# Patient Record
Sex: MALE | Race: WHITE | NOT HISPANIC OR LATINO | Employment: FULL TIME | URBAN - METROPOLITAN AREA
[De-identification: names, ages, dates, MRNs, and addresses within clinical notes are randomized per-mention and may not be internally consistent; named-entity substitution may affect disease eponyms.]

---

## 2019-02-15 ENCOUNTER — OFFICE VISIT (OUTPATIENT)
Dept: FAMILY MEDICINE CLINIC | Facility: CLINIC | Age: 34
End: 2019-02-15
Payer: COMMERCIAL

## 2019-02-15 VITALS
SYSTOLIC BLOOD PRESSURE: 132 MMHG | OXYGEN SATURATION: 96 % | DIASTOLIC BLOOD PRESSURE: 82 MMHG | BODY MASS INDEX: 26.27 KG/M2 | TEMPERATURE: 98.4 F | WEIGHT: 222.5 LBS | HEIGHT: 77 IN | HEART RATE: 95 BPM

## 2019-02-15 DIAGNOSIS — Z76.89 ENCOUNTER TO ESTABLISH CARE: ICD-10-CM

## 2019-02-15 DIAGNOSIS — M25.511 CHRONIC RIGHT SHOULDER PAIN: ICD-10-CM

## 2019-02-15 DIAGNOSIS — M25.522 LEFT ELBOW PAIN: ICD-10-CM

## 2019-02-15 DIAGNOSIS — H00.014 HORDEOLUM EXTERNUM LEFT UPPER EYELID: Primary | ICD-10-CM

## 2019-02-15 DIAGNOSIS — G89.29 CHRONIC RIGHT SHOULDER PAIN: ICD-10-CM

## 2019-02-15 PROCEDURE — 3008F BODY MASS INDEX DOCD: CPT | Performed by: NURSE PRACTITIONER

## 2019-02-15 PROCEDURE — 99202 OFFICE O/P NEW SF 15 MIN: CPT | Performed by: NURSE PRACTITIONER

## 2019-02-15 RX ORDER — BACITRACIN 500 [USP'U]/G
OINTMENT OPHTHALMIC 3 TIMES DAILY
Qty: 3.5 G | Refills: 0 | Status: SHIPPED | OUTPATIENT
Start: 2019-02-15 | End: 2019-02-16 | Stop reason: SDUPTHER

## 2019-02-15 NOTE — PROGRESS NOTES
[facility]    ASSESSMENT AND PLAN     1  Hordeolum externum left upper eyelid  65-year-old male presents today with signs and symptoms consistent of a stye his left upper eyelid  Physical assessment is as documented below  Rx for bacitracin ophthalmic ointment given today  Symptom management reviewed:  Maintain good hygiene,  warm compresses to eye  Advised he be re-evaluated should his symptoms change, persist and/or worsen  - bacitracin ophthalmic ointment; Administer into the left eye 3 (three) times a day  Dispense: 3 5 g; Refill: 0    2  Encounter to establish care  Patient establishing primary care in this office  3  Chronic right shoulder pain  Patient complains of occasional chronic right shoulder pain from an old right shoulder injury  Celso Aashish He is not currently having any shoulder pain today  He just wanted to have it in his record  He has had physical therapy in the past with good results  4  Left elbow pain  Patient complains of left elbow pain  He states he thinks he may have pulled it while shoveling snow last week  He states the pain has been resolving over the last few days  He has full range of motion of the elbow  Good pulses  Advised to ice and/or heat for symptom management and ibuprofen as needed  He is to be re-evaluated should his symptoms change, persist and/or worsen  NOTE:  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  He takes Tylenol and/or Advil only for pain  SUBJECTIVE       Patient ID: Edgar Copeland is a 28 y o  male  Chief Complaint   Patient presents with   [facility]    Shoulder Pain     R shoulder off and on x years    Elbow Pain     L elbow pain with extension since shoveling x 4 days    Stye     L upper eyelid x 1 week       HISTORY OF PRESENT ILLNESS    Patient presents today with complaint of a pimple in his left upper eyelid x1 week  Seems to be getting larger and not getting better    Nothing is draining out of it   Vision is intact  Denies pain  He is also here to establish care in this office  He wanted to discuss shoulder pain that he has had on and off for several years, secondary to an old shoulder injury  He is not currently having pain today  Currently, he is having some left elbow pain that started after shoveling snow a several days ago  The following portions of the patient's history were reviewed and updated as appropriate: allergies, current medications, past medical history and problem list     REVIEW OF SYSTEMS  Review of Systems   Constitutional: Negative  HENT: Negative  Eyes: Negative for pain, discharge and redness  Pimple left upper eyelid   Respiratory: Negative  Cardiovascular: Negative  Neurological: Negative  Psychiatric/Behavioral: Negative  OBJECTIVE      VITAL SIGNS  /82 (BP Location: Left arm, Patient Position: Sitting)   Pulse 95   Temp 98 4 °F (36 9 °C) (Tympanic)   Ht 6' 5" (1 956 m)   Wt 101 kg (222 lb 8 oz)   SpO2 96%   BMI 26 38 kg/m²     CURRENT MEDICATIONS    Current Outpatient Medications:     bacitracin ophthalmic ointment, Administer into the left eye 3 (three) times a day, Disp: 3 5 g, Rfl: 0      PHYSICAL EXAMINATION   Physical Exam   Constitutional: Vital signs are normal  He appears well-developed and well-nourished  HENT:   Head: Normocephalic  Eyes: Pupils are equal, round, and reactive to light  Conjunctivae and EOM are normal  Left eye exhibits hordeolum  Cardiovascular: Normal rate, regular rhythm and normal heart sounds  Pulses:       Radial pulses are 2+ on the left side  Pulmonary/Chest: Effort normal and breath sounds normal  He has no decreased breath sounds  He has no wheezes  Musculoskeletal:        Right shoulder: He exhibits normal range of motion, no tenderness, no pain and normal strength  Left elbow: He exhibits normal range of motion, no swelling and no effusion  No tenderness found  Lymphadenopathy:        Head (right side): No submental, no submandibular, no tonsillar, no preauricular, no posterior auricular and no occipital adenopathy present  Head (left side): No submental, no submandibular, no tonsillar, no preauricular, no posterior auricular and no occipital adenopathy present  He has no cervical adenopathy  Psychiatric: He has a normal mood and affect  His speech is normal and behavior is normal  Judgment and thought content normal  Cognition and memory are normal    Nursing note and vitals reviewed

## 2019-02-16 ENCOUNTER — TELEPHONE (OUTPATIENT)
Dept: FAMILY MEDICINE CLINIC | Facility: CLINIC | Age: 34
End: 2019-02-16

## 2019-02-16 DIAGNOSIS — H00.014 HORDEOLUM EXTERNUM LEFT UPPER EYELID: ICD-10-CM

## 2019-02-16 RX ORDER — BACITRACIN 500 [USP'U]/G
OINTMENT OPHTHALMIC 3 TIMES DAILY
Qty: 3.5 G | Refills: 0 | Status: SHIPPED | OUTPATIENT
Start: 2019-02-16

## 2019-02-16 NOTE — TELEPHONE ENCOUNTER
Patient called the office stating he was seen yesterday 2/15/2019  Patient states that he was prescribed an ointment for his eye  Patient states medication was to go to [facility] but was not there  I did double check and it looks like medication went to [facility]   Please send rx to [facility]

## 2019-11-25 ENCOUNTER — OFFICE VISIT (OUTPATIENT)
Dept: FAMILY MEDICINE CLINIC | Facility: CLINIC | Age: 34
End: 2019-11-25
Payer: COMMERCIAL

## 2019-11-25 VITALS
SYSTOLIC BLOOD PRESSURE: 110 MMHG | BODY MASS INDEX: 27.47 KG/M2 | HEIGHT: 77 IN | DIASTOLIC BLOOD PRESSURE: 82 MMHG | TEMPERATURE: 97.9 F | OXYGEN SATURATION: 96 % | HEART RATE: 60 BPM | RESPIRATION RATE: 17 BRPM | WEIGHT: 232.6 LBS

## 2019-11-25 DIAGNOSIS — Z23 NEED FOR PROPHYLACTIC VACCINATION AND INOCULATION AGAINST INFLUENZA: ICD-10-CM

## 2019-11-25 DIAGNOSIS — S01.111A EYEBROW LACERATION, RIGHT, INITIAL ENCOUNTER: Primary | ICD-10-CM

## 2019-11-25 PROCEDURE — 90686 IIV4 VACC NO PRSV 0.5 ML IM: CPT | Performed by: FAMILY MEDICINE

## 2019-11-25 PROCEDURE — 99213 OFFICE O/P EST LOW 20 MIN: CPT | Performed by: FAMILY MEDICINE

## 2019-11-25 PROCEDURE — 90471 IMMUNIZATION ADMIN: CPT | Performed by: FAMILY MEDICINE

## 2019-11-25 NOTE — PROGRESS NOTES
Assessment/Plan:   1  Eyebrow laceration, right, initial encounter  Patient tolerated suture removal very well today  His wound appears clean dry and intact  He was instructed on the importance of proper wound care  Per patient, he is up-to-date with his Adacel vaccine  Will attempt to request this record  Follow up with patient if any symptoms worsen  2  Need for prophylactic vaccination and inoculation against influenza  - influenza vaccine, [id number], quadrivalent, 0 5 mL, preservative-free, for adult and pediatric patients 6 mos+ (AFLURIA, [address] [facility])           Diagnoses and all orders for this visit:    Need for prophylactic vaccination and inoculation against influenza  -     influenza vaccine, [id number], quadrivalent, 0 5 mL, preservative-free, for adult and pediatric patients 6 mos+ (AFLURIA, FLUARIX, FLULAVAL, FLUZONE)          Subjective:       Chief Complaint   Patient presents with    Suture / Staple Removal     #7 R eye 11/20      Patient ID: Gabi Leiva is a 27 y o  male  Patient is a 44-year-old male presents today for a hospital follow-up  He states that he was seen in the ED secondary to a laceration he sustained over his right eyebrow  He states that he was working on a pipe by loosening the pipe however upon removing the PIP struck his head  He did notice a small laceration as well as bleeding  He was seen in the ED and did have an evaluation  He did have a CT of his head which did appear normal   He did have 7 sutures placed in like to rule him today  He states that he is up-to-date with his Adacel vaccine      Review of Systems   Constitutional: Negative for activity change, chills, fatigue and fever  HENT: Negative for congestion, ear pain, sinus pressure and sore throat  Eyes: Negative for redness, itching and visual disturbance  Respiratory: Negative for cough and shortness of breath  Cardiovascular: Negative for chest pain and palpitations  Gastrointestinal: Negative for abdominal pain, diarrhea and nausea  Endocrine: Negative for cold intolerance and heat intolerance  Genitourinary: Negative for dysuria, flank pain and frequency  Musculoskeletal: Negative for arthralgias, back pain, gait problem and myalgias  Skin: Negative for color change  Allergic/Immunologic: Negative for environmental allergies  Neurological: Negative for dizziness, numbness and headaches  Psychiatric/Behavioral: Negative for behavioral problems and sleep disturbance  The following portions of the patient's history were reviewed and updated as appropriate : past family history, past medical history, past social history and past surgical history  Current Outpatient Medications:     bacitracin ophthalmic ointment, Administer into the left eye 3 (three) times a day, Disp: 3 5 g, Rfl: 0         Objective:         Vitals:    11/25/19 1559   BP: 110/82   BP Location: Left arm   Patient Position: Sitting   Cuff Size: Large   Pulse: 60   Resp: 17   Temp: 97 9 °F (36 6 °C)   TempSrc: Tympanic   SpO2: 96%   Weight: 106 kg (232 lb 9 6 oz)   Height: 6' 5" (1 956 m)     Physical Exam   Constitutional: He is oriented to person, place, and time  Vital signs are normal  He appears well-developed and well-nourished  HENT:   Head: Normocephalic and atraumatic  Right Ear: Hearing normal    Left Ear: Hearing normal    Nose: Nose normal  No septal deviation  Mouth/Throat: Oropharynx is clear and moist and mucous membranes are normal    Eyes: Pupils are equal, round, and reactive to light  EOM and lids are normal    Neck: Trachea normal and normal range of motion  No thyroid mass and no thyromegaly present  Cardiovascular: Normal rate  Pulmonary/Chest: Effort normal  No respiratory distress  He has no decreased breath sounds  Abdominal: Normal appearance  There is no guarding  Musculoskeletal: Normal range of motion     Neurological: He is alert and oriented to person, place, and time  He has normal strength  No cranial nerve deficit or sensory deficit  Skin: Skin is warm and dry  Psychiatric: He has a normal mood and affect  His speech is normal and behavior is normal  Judgment and thought content normal  Cognition and memory are normal    Vitals reviewed  Suture removal  Date/Time: 11/25/2019 4:14 PM  Performed by: Good Bradley DO  Authorized by: Good Bradley DO     Consent:     Consent obtained:  Verbal    Consent given by:  Patient  Location:     Laterality:  Right    Location:  Head/neck    Head/neck location:  Eyebrow    Eyebrow location:  R eyebrow  Procedure details: Tools used:  Suture removal kit    Wound appearance:  No sign(s) of infection, good wound healing and clean    Number of sutures removed:  7  Post-procedure details:     Patient tolerance of procedure:   Tolerated well, no immediate complications

## 2023-02-16 ENCOUNTER — TRANSITIONAL CARE MANAGEMENT (OUTPATIENT)
Dept: FAMILY MEDICINE CLINIC | Facility: CLINIC | Age: 38
End: 2023-02-16